# Patient Record
Sex: MALE | Race: WHITE | Employment: FULL TIME | URBAN - METROPOLITAN AREA
[De-identification: names, ages, dates, MRNs, and addresses within clinical notes are randomized per-mention and may not be internally consistent; named-entity substitution may affect disease eponyms.]

---

## 2021-01-15 ENCOUNTER — OFFICE VISIT (OUTPATIENT)
Dept: INTERNAL MEDICINE CLINIC | Age: 51
End: 2021-01-15
Payer: COMMERCIAL

## 2021-01-15 VITALS
BODY MASS INDEX: 26.72 KG/M2 | RESPIRATION RATE: 18 BRPM | DIASTOLIC BLOOD PRESSURE: 87 MMHG | OXYGEN SATURATION: 99 % | HEART RATE: 113 BPM | HEIGHT: 69 IN | TEMPERATURE: 98.2 F | SYSTOLIC BLOOD PRESSURE: 129 MMHG | WEIGHT: 180.4 LBS

## 2021-01-15 DIAGNOSIS — M54.50 CHRONIC BILATERAL LOW BACK PAIN WITHOUT SCIATICA: Primary | ICD-10-CM

## 2021-01-15 DIAGNOSIS — Z76.89 ENCOUNTER TO ESTABLISH CARE: ICD-10-CM

## 2021-01-15 DIAGNOSIS — G89.29 CHRONIC BILATERAL LOW BACK PAIN WITHOUT SCIATICA: Primary | ICD-10-CM

## 2021-01-15 DIAGNOSIS — K21.9 GASTROESOPHAGEAL REFLUX DISEASE WITHOUT ESOPHAGITIS: ICD-10-CM

## 2021-01-15 DIAGNOSIS — I10 ESSENTIAL HYPERTENSION: ICD-10-CM

## 2021-01-15 PROCEDURE — 99204 OFFICE O/P NEW MOD 45 MIN: CPT | Performed by: INTERNAL MEDICINE

## 2021-01-15 RX ORDER — ESOMEPRAZOLE MAGNESIUM 40 MG/1
40 CAPSULE, DELAYED RELEASE ORAL 2 TIMES DAILY
Qty: 180 CAP | Refills: 0 | Status: SHIPPED | OUTPATIENT
Start: 2021-01-15 | End: 2021-02-25 | Stop reason: SDUPTHER

## 2021-01-15 RX ORDER — ESOMEPRAZOLE MAGNESIUM 40 MG/1
40 CAPSULE, DELAYED RELEASE ORAL 2 TIMES DAILY
COMMUNITY
End: 2021-01-15 | Stop reason: SDUPTHER

## 2021-01-15 RX ORDER — LOSARTAN POTASSIUM 25 MG/1
25 TABLET ORAL DAILY
Qty: 30 TAB | Refills: 2 | Status: SHIPPED | OUTPATIENT
Start: 2021-01-15 | End: 2021-02-25 | Stop reason: SDUPTHER

## 2021-01-15 RX ORDER — GABAPENTIN 300 MG/1
300 CAPSULE ORAL 3 TIMES DAILY
Qty: 90 CAP | Refills: 2 | Status: SHIPPED | OUTPATIENT
Start: 2021-01-15 | End: 2021-02-25 | Stop reason: SDUPTHER

## 2021-01-15 RX ORDER — TRAMADOL HYDROCHLORIDE 50 MG/1
50 TABLET ORAL
COMMUNITY
End: 2021-01-15 | Stop reason: ALTCHOICE

## 2021-01-15 NOTE — PROGRESS NOTES
Lorne Dewitt (: 1970) is a 45 y.o. male, new patient, here for evaluation of the following chief complaint(s):  Chief Complaint   Patient presents with    New Patient     needs ultram 50 mg and Nexium 40mg.  Back Pain     lower back       Assessment and Plan:       ICD-10-CM ICD-9-CM    1. Chronic bilateral low back pain without sciatica  M54.5 724.2 gabapentin (NEURONTIN) 300 mg capsule    G89.29 338.29 CBC WITH AUTOMATED DIFF      METABOLIC PANEL, COMPREHENSIVE      10-DRUG SCREEN W/CONF   2. Gastroesophageal reflux disease without esophagitis  K21.9 530.81 esomeprazole (NexIUM) 40 mg capsule      CBC WITH AUTOMATED DIFF      METABOLIC PANEL, COMPREHENSIVE      REFERRAL TO GASTROENTEROLOGY   3. Encounter to establish care  Z76.89 V65.8    4. Essential hypertension  I10 401.9 losartan (COZAAR) 25 mg tablet       1. Alternative mgt reviewed. Plan start and titrate as needed. Monitoring labs reviewed as above. He will do at [facility] as reviewed. 2.  Refill at current dose and referral reviewed. Referral(s) and referral coordination reviewed with patient at visit. 4.  Notes and history elevated BP. Reviewed starting medication as below and monitoring at follow-up. Follow-up and Dispositions    · Return in about 4 weeks (around 2021) for medication follow-up, lab review; [facility] labs 1-2 weeks prior to visit.       lab results and schedule of future lab studies reviewed with patient  reviewed medications and side effects in detail    For additional documentation of information and/or recommendations discussed this visit, please see notes in instructions. Plan and evaluation (above) reviewed with pt at visit  Patient voiced understanding of plan and provided with time to ask/review questions. After Visit Summary (AVS) provided to pt after visit with additional instructions as needed/reviewed.       Future Appointments   Date Time Provider Leigh Ann Bashir   2021  3:30 PM Ivette Bailey MD [facility] AMB   --Updated future visits after patient check-out. History of Present Illness:     Notes (nursing/rooming note copied below in italics):  As above. Here to establish care. Pt has no records in [facility] or [address] [place] [address]. He has records from his prior PCP Dr. Adelita Brooks. He had received Tramadol and Nexium from PCP previously, but provider had to leave practice suddenly--this is not dated. He had Tramadol as 50mg every 6 hours on medication list, but receiving #240 (not #120) monthly as below. Reviewed do not do routine pain mgt and can refer, but would not refill here. He would prefer to try other medications that can be prescribed here. Reviewed gabapentin as below. He is not interested in referral to pain mgt for evaluation or other management. Prescription Monitoring Program ([place]) database query with fills:  Indra Diaz Date ID   Written Drug Qty Days Prescriber Rx # Pharmacy Refill   Daily Dose* Pymt Type      12/07/2020  1   07/16/2020  Tramadol Hcl 50 MG Tablet  240.00  30 Wi Squ   [id number]   [id number]   [facility]   11/09/2020  1   07/16/2020  Tramadol Hcl 50 MG Tablet  240.00  30 Wi Squ   [id number]   [id number]   4  [facility]   10/13/2020  1   07/16/2020  Tramadol Hcl 50 MG Tablet  240.00  30 Wi Squ   [id number]   [id number]   3  [facility]   09/16/2020  1   07/16/2020  Tramadol Hcl 50 MG Tablet  240.00  30 Wi Squ   [id number]   [id number]   2  [facility]   08/20/2020  1   07/16/2020  Tramadol Hcl 50 MG Tablet  240.00  30 Wi Squ   [id number]   [id number]   1  [facility]       He has copies of medical records and labs--labs from 2020     Notes indicate recommendation to see Dr. Feng Chavarria (not clarified what specialty). He notes he has back pain due to injury at work.   Recommended pain mgt, but he is not willing to see--prefers to     He has no history of spine/spinal injury. He notes he had initial injury with his back in 2011. He was managed as an outpatient, and saw orthopedist, who did spinal evaluation and started on physical therapy then. He notes has ongoign lifting with work and has pain/spasms fro that. He has pain daily due to cold weather. He will typically have spasms. He notes only right-sided (UE and LE arthritis. Prior surgery on his right knee. He notes has not seen GI to evaluate need for high-dose Nexium. He notes Zantac, or famotidine strong enough. Reviewed refill, but should see GI since on such high-dose PPI to manage. Nursing screenings reviewed by provider at visit. Past Medical History:   Diagnosis Date    GERD (gastroesophageal reflux disease)      History reviewed. No pertinent surgical history. Prior to Admission medications    Medication Sig Start Date End Date Taking? Authorizing Provider   esomeprazole (NexIUM) 40 mg capsule Take 40 mg by mouth two (2) times a day. Yes Provider, Historical   traMADoL (Ultram) 50 mg tablet Take 50 mg by mouth every six (6) hours as needed for Pain (1-2 tabs every six hours for pain). Yes Provider, Historical   Dexlansoprazole (DEXILANT) 60 mg CpDM Take 1 Cap by mouth daily. 7/3/12   Harris Chirinos MD        ROS  Complete ROS negative except as indicated in note. Vitals:    01/15/21 1446 01/15/21 1450   BP: (!) 150/95 129/87   Pulse: (!) 113    Resp: 18    Temp: 98.2 °F (36.8 °C)    TempSrc: Oral    SpO2: 99%    Weight: 180 lb 6.4 oz (81.8 kg)    Height: 5' 9\" (1.753 m)    PainSc:   7       Body mass index is 26.64 kg/m². Physical Exam:     Physical Exam  Vitals signs and nursing note reviewed. Constitutional:       General: He is not in acute distress. Appearance: Normal appearance. He is well-developed. He is not diaphoretic. HENT:      Head: Normocephalic and atraumatic. Eyes:      General: No scleral icterus. Right eye: No discharge. Left eye: No discharge. Conjunctiva/sclera: Conjunctivae normal.   Cardiovascular:      Rate and Rhythm: Normal rate and regular rhythm. Pulses: Normal pulses. Heart sounds: Normal heart sounds. No murmur. No friction rub. No gallop. Pulmonary:      Effort: Pulmonary effort is normal. No respiratory distress. Breath sounds: Normal breath sounds. No stridor. No wheezing, rhonchi or rales. Abdominal:      General: Bowel sounds are normal. There is no distension. Palpations: Abdomen is soft. Tenderness: There is no abdominal tenderness. There is no guarding or rebound. Musculoskeletal:         General: No swelling, tenderness or deformity. Right lower leg: No edema. Left lower leg: No edema. Skin:     General: Skin is warm. Coloration: Skin is not jaundiced or pale. Findings: No bruising, erythema or rash. Neurological:      General: No focal deficit present. Mental Status: He is alert. Motor: No abnormal muscle tone. Coordination: Coordination normal.      Gait: Gait normal.   Psychiatric:         Mood and Affect: Mood normal.         Behavior: Behavior normal.         Thought Content: Thought content normal.         Judgment: Judgment normal.         An electronic signature was used to authenticate this note.   -- Delta Garcia MD

## 2021-01-15 NOTE — PATIENT INSTRUCTIONS
Please follow the following instructions to process/authorize your referral, if needed: 
 
Referrals processing Please verify with your insurance IF you need referral authorization submitted. For insurance plans which require this, please follow the following steps. FAILURE TO DO SO MAY RESULT IN INABILITY TO SEE THE SPECIALIST YOU HAVE BEEN REFERRED TO (once you are scheduled to see them). 1. Call and schedule appointment with specialist 
2. Call our clinic and leave message with provider name, and date of appointment 3. We will then submit the referral to your insurance. This process takes 2-5 business days. If you have questions about scheduling or authorizing referral, you can review with our referral coordinator. You can review with her today if available/if you have time, or you can call to review once you have made your referral/appointment. If you are not sure if you need referral authorizations, please review with the referral coordinator(s), either prior to or after you have made the appointment, as reviewed.

## 2021-01-15 NOTE — PROGRESS NOTES
# 16  3 most recent PHQ Screens 1/15/2021   Little interest or pleasure in doing things Not at all   Feeling down, depressed, irritable, or hopeless Not at all   Total Score PHQ 2 0           Chief Complaint   Patient presents with   • New Patient     needs ultram 50 mg and Nexium 40mg.   • Back Pain     lower back       1. Have you been to the ER, urgent care clinic since your last visit?  Hospitalized since your last visit?No    2. Have you seen or consulted any other health care providers outside of the [facility] since your last visit?  Include any pap smears or colon screening. No    Health Maintenance Due   Topic Date Due   • DTaP/Tdap/Td series (1 - Tdap) 12/14/1990   • Lipid Screen  12/14/2009   • Shingrix Vaccine Age 50> (1 of 2) 12/14/2019   • Colorectal Cancer Screening Combo  12/14/2019   • Flu Vaccine (1) 09/01/2020       No flowsheet data found.        AVS, education, follow up and recommendations provided and addressed with patient.  services used to advise patient no

## 2021-01-21 LAB
ALBUMIN SERPL-MCNC: 4.2 G/DL (ref 3.8–4.9)
ALBUMIN/GLOB SERPL: 1.7 {RATIO} (ref 1.2–2.2)
ALP SERPL-CCNC: 124 IU/L (ref 39–117)
ALT SERPL-CCNC: 68 IU/L (ref 0–44)
AMPHETAMINES UR QL SCN: NEGATIVE NG/ML
AST SERPL-CCNC: 40 IU/L (ref 0–40)
BARBITURATES UR QL SCN: NEGATIVE NG/ML
BASOPHILS # BLD AUTO: 0.1 X10E3/UL (ref 0–0.2)
BASOPHILS NFR BLD AUTO: 0 %
BENZODIAZ UR QL SCN: NEGATIVE NG/ML
BILIRUB SERPL-MCNC: <0.2 MG/DL (ref 0–1.2)
BUN SERPL-MCNC: 11 MG/DL (ref 6–24)
BUN/CREAT SERPL: 11 (ref 9–20)
BZE UR QL SCN: NEGATIVE NG/ML
CALCIUM SERPL-MCNC: 9.6 MG/DL (ref 8.7–10.2)
CANNABINOIDS UR QL SCN: NEGATIVE NG/ML
CHLORIDE SERPL-SCNC: 105 MMOL/L (ref 96–106)
CO2 SERPL-SCNC: 26 MMOL/L (ref 20–29)
CREAT SERPL-MCNC: 0.96 MG/DL (ref 0.76–1.27)
CREAT UR-MCNC: 116.7 MG/DL (ref 20–300)
EOSINOPHIL # BLD AUTO: 0.4 X10E3/UL (ref 0–0.4)
EOSINOPHIL NFR BLD AUTO: 3 %
ERYTHROCYTE [DISTWIDTH] IN BLOOD BY AUTOMATED COUNT: 12.7 % (ref 11.6–15.4)
GLOBULIN SER CALC-MCNC: 2.5 G/DL (ref 1.5–4.5)
GLUCOSE SERPL-MCNC: 93 MG/DL (ref 65–99)
HCT VFR BLD AUTO: 43.8 % (ref 37.5–51)
HGB BLD-MCNC: 15.4 G/DL (ref 13–17.7)
IMM GRANULOCYTES # BLD AUTO: 0.1 X10E3/UL (ref 0–0.1)
IMM GRANULOCYTES NFR BLD AUTO: 1 %
LYMPHOCYTES # BLD AUTO: 3.1 X10E3/UL (ref 0.7–3.1)
LYMPHOCYTES NFR BLD AUTO: 27 %
MCH RBC QN AUTO: 30.3 PG (ref 26.6–33)
MCHC RBC AUTO-ENTMCNC: 35.2 G/DL (ref 31.5–35.7)
MCV RBC AUTO: 86 FL (ref 79–97)
MED LIST OPTION NOT SELECTED: NORMAL
METHADONE UR QL SCN: NEGATIVE NG/ML
MONOCYTES # BLD AUTO: 1 X10E3/UL (ref 0.1–0.9)
MONOCYTES NFR BLD AUTO: 9 %
NEUTROPHILS # BLD AUTO: 7 X10E3/UL (ref 1.4–7)
NEUTROPHILS NFR BLD AUTO: 60 %
OPIATES UR QL SCN: NEGATIVE NG/ML
OXYCODONE+OXYMORPHONE UR QL SCN: NEGATIVE NG/ML
PCP UR QL: NEGATIVE NG/ML
PH UR: 6.5 [PH] (ref 4.5–8.9)
PLATELET # BLD AUTO: 352 X10E3/UL (ref 150–450)
PLEASE NOTE:, 733157: NORMAL
POTASSIUM SERPL-SCNC: 4.6 MMOL/L (ref 3.5–5.2)
PROPOXYPH UR QL SCN: NEGATIVE NG/ML
PROT SERPL-MCNC: 6.7 G/DL (ref 6–8.5)
RBC # BLD AUTO: 5.09 X10E6/UL (ref 4.14–5.8)
SODIUM SERPL-SCNC: 143 MMOL/L (ref 134–144)
WBC # BLD AUTO: 11.6 X10E3/UL (ref 3.4–10.8)

## 2021-02-25 ENCOUNTER — OFFICE VISIT (OUTPATIENT)
Dept: INTERNAL MEDICINE CLINIC | Age: 51
End: 2021-02-25
Payer: COMMERCIAL

## 2021-02-25 VITALS
HEIGHT: 69 IN | DIASTOLIC BLOOD PRESSURE: 76 MMHG | WEIGHT: 179.25 LBS | HEART RATE: 105 BPM | BODY MASS INDEX: 26.55 KG/M2 | SYSTOLIC BLOOD PRESSURE: 125 MMHG | RESPIRATION RATE: 18 BRPM | OXYGEN SATURATION: 97 % | TEMPERATURE: 98.1 F

## 2021-02-25 DIAGNOSIS — D72.829 LEUKOCYTOSIS, UNSPECIFIED TYPE: ICD-10-CM

## 2021-02-25 DIAGNOSIS — G89.29 CHRONIC BILATERAL LOW BACK PAIN WITHOUT SCIATICA: ICD-10-CM

## 2021-02-25 DIAGNOSIS — K21.9 GASTROESOPHAGEAL REFLUX DISEASE WITHOUT ESOPHAGITIS: ICD-10-CM

## 2021-02-25 DIAGNOSIS — I10 ESSENTIAL HYPERTENSION: Primary | ICD-10-CM

## 2021-02-25 DIAGNOSIS — M54.50 CHRONIC BILATERAL LOW BACK PAIN WITHOUT SCIATICA: ICD-10-CM

## 2021-02-25 LAB
ALBUMIN SERPL-MCNC: 3.9 G/DL (ref 3.5–5)
ALBUMIN/GLOB SERPL: 1.2 {RATIO} (ref 1.1–2.2)
ALP SERPL-CCNC: 126 U/L (ref 45–117)
ALT SERPL-CCNC: 60 U/L (ref 12–78)
ANION GAP SERPL CALC-SCNC: 6 MMOL/L (ref 5–15)
AST SERPL-CCNC: 27 U/L (ref 15–37)
BASOPHILS # BLD: 0.1 K/UL (ref 0–0.1)
BASOPHILS NFR BLD: 1 % (ref 0–1)
BILIRUB SERPL-MCNC: 0.2 MG/DL (ref 0.2–1)
BUN SERPL-MCNC: 9 MG/DL (ref 6–20)
BUN/CREAT SERPL: 7 (ref 12–20)
CALCIUM SERPL-MCNC: 9.2 MG/DL (ref 8.5–10.1)
CHLORIDE SERPL-SCNC: 111 MMOL/L (ref 97–108)
CO2 SERPL-SCNC: 26 MMOL/L (ref 21–32)
CREAT SERPL-MCNC: 1.24 MG/DL (ref 0.7–1.3)
DIFFERENTIAL METHOD BLD: ABNORMAL
EOSINOPHIL # BLD: 0.4 K/UL (ref 0–0.4)
EOSINOPHIL NFR BLD: 3 % (ref 0–7)
ERYTHROCYTE [DISTWIDTH] IN BLOOD BY AUTOMATED COUNT: 13.1 % (ref 11.5–14.5)
GLOBULIN SER CALC-MCNC: 3.3 G/DL (ref 2–4)
GLUCOSE SERPL-MCNC: 106 MG/DL (ref 65–100)
HCT VFR BLD AUTO: 42.8 % (ref 36.6–50.3)
HGB BLD-MCNC: 14.3 G/DL (ref 12.1–17)
IMM GRANULOCYTES # BLD AUTO: 0.1 K/UL (ref 0–0.04)
IMM GRANULOCYTES NFR BLD AUTO: 1 % (ref 0–0.5)
LYMPHOCYTES # BLD: 3.2 K/UL (ref 0.8–3.5)
LYMPHOCYTES NFR BLD: 26 % (ref 12–49)
MCH RBC QN AUTO: 29.2 PG (ref 26–34)
MCHC RBC AUTO-ENTMCNC: 33.4 G/DL (ref 30–36.5)
MCV RBC AUTO: 87.3 FL (ref 80–99)
MONOCYTES # BLD: 1 K/UL (ref 0–1)
MONOCYTES NFR BLD: 8 % (ref 5–13)
NEUTS SEG # BLD: 7.5 K/UL (ref 1.8–8)
NEUTS SEG NFR BLD: 61 % (ref 32–75)
NRBC # BLD: 0 K/UL (ref 0–0.01)
NRBC BLD-RTO: 0 PER 100 WBC
PLATELET # BLD AUTO: 350 K/UL (ref 150–400)
PMV BLD AUTO: 10.2 FL (ref 8.9–12.9)
POTASSIUM SERPL-SCNC: 3.9 MMOL/L (ref 3.5–5.1)
PROT SERPL-MCNC: 7.2 G/DL (ref 6.4–8.2)
RBC # BLD AUTO: 4.9 M/UL (ref 4.1–5.7)
SODIUM SERPL-SCNC: 143 MMOL/L (ref 136–145)
WBC # BLD AUTO: 12.2 K/UL (ref 4.1–11.1)

## 2021-02-25 PROCEDURE — 99214 OFFICE O/P EST MOD 30 MIN: CPT | Performed by: INTERNAL MEDICINE

## 2021-02-25 RX ORDER — LOSARTAN POTASSIUM 25 MG/1
25 TABLET ORAL DAILY
Qty: 90 TAB | Refills: 1 | Status: SHIPPED | OUTPATIENT
Start: 2021-02-25 | End: 2021-08-18 | Stop reason: SDUPTHER

## 2021-02-25 RX ORDER — GABAPENTIN 300 MG/1
300 CAPSULE ORAL 3 TIMES DAILY
Qty: 270 CAP | Refills: 1 | Status: SHIPPED | OUTPATIENT
Start: 2021-02-25 | End: 2021-08-18

## 2021-02-25 RX ORDER — ESOMEPRAZOLE MAGNESIUM 40 MG/1
40 CAPSULE, DELAYED RELEASE ORAL 2 TIMES DAILY
Qty: 180 CAP | Refills: 1 | Status: SHIPPED | OUTPATIENT
Start: 2021-02-25 | End: 2021-08-18 | Stop reason: SDUPTHER

## 2021-02-25 NOTE — PROGRESS NOTES
Myrna Abraham (: 1970) is a 45 y.o. male, established patient, here for evaluation of the following chief complaint(s):  Chief Complaint   Patient presents with    Medication Refill     f/u       Assessment and Plan:       ICD-10-CM ICD-9-CM    1. Essential hypertension  J55 988.0 METABOLIC PANEL, COMPREHENSIVE      losartan (COZAAR) 25 mg tablet      METABOLIC PANEL, COMPREHENSIVE   2. Leukocytosis, unspecified type  D72.829 288.60 CBC WITH AUTOMATED DIFF      CBC WITH AUTOMATED DIFF   3. Chronic bilateral low back pain without sciatica  M54.5 724.2 gabapentin (NEURONTIN) 300 mg capsule    G89.29 338.29    4. Gastroesophageal reflux disease without esophagitis  K21.9 530.81 esomeprazole (NexIUM) 40 mg capsule       1. Reviewed refill as requested, and monitoring labs. 2.  Evaluation reviewed. No associated symptoms and mild elevation prior. 3.  Doing well with gabapentin instead of Tramadol. Refill reviewed at visit. 4.   Notes he did see GI and maintained on current BID dosing. Refill reviewed. Follow-up and Dispositions    · Return in about 6 months (around 2021) for Blood Pressure follow-up, medication follow-up, non-fasting labs. lab results and schedule of future lab studies reviewed with patient  reviewed medications and side effects in detail    Plan and evaluation (above) reviewed with pt at visit  Patient voiced understanding of plan and provided with time to ask/review questions. After Visit Summary (AVS) provided to pt after visit with additional instructions as needed/reviewed. Future Appointments   Date Time Provider Leigh Ann Bashir   2021  3:30 PM Carlee Escobar MD [facility] AMB   --Updated future visits after patient check-out. History of Present Illness:     Notes (nursing/rooming note copied below in italics):  As above. Here fro follow-up. Established care here with me 1-15-21. He notes doing well with gabapentin.   Notes not sedated and good pain control. Refills of medications reviewed as below. Prescription Monitoring Program ([place]) database query with fills:  Fill Date ID   Written Drug Qty Days Prescriber Rx # Pharmacy Refill   Daily Dose* Pymt Type      02/11/2021  1   01/15/2021  Gabapentin 300 MG Capsule  90.00  30 Cl Chi   [id number]   [id number]   01/15/2021  1   01/15/2021  Gabapentin 300 MG Capsule  90.00  30 Cl Chi   [id number]   [id number]   [facility]   12/07/2020  1   07/16/2020  Tramadol Hcl 50 MG Tablet  240.00  30 Wi Squ   [id number]   [facility] ([id number])   5  40.00 MME  [facility] Ins          Nursing screenings reviewed by provider at visit. Prior to Admission medications    Medication Sig Start Date End Date Taking? Authorizing Provider   esomeprazole (NexIUM) 40 mg capsule Take 1 Cap by mouth two (2) times a day. 1/15/21  Yes Cornelius Etienne MD   gabapentin (NEURONTIN) 300 mg capsule Take 1 Cap by mouth three (3) times daily. Max Daily Amount: 900 mg. Take instead of Tramadol. 1/15/21  Yes Cornelius Etienne MD   losartan (COZAAR) 25 mg tablet Take 1 Tab by mouth daily. 1/15/21  Yes Cornelius Etienne MD        ROS    Vitals:    02/25/21 1538 02/25/21 1610   BP: (!) 137/90 125/76   Pulse: (!) 105    Resp: 18    Temp: 98.1 °F (36.7 °C)    TempSrc: Temporal    SpO2: 97%    Weight: 179 lb 4 oz (81.3 kg)    Height: 5' 9\" (1.753 m)    PainSc:   0 - No pain       Body mass index is 26.47 kg/m². Physical Exam:     Physical Exam  Vitals signs and nursing note reviewed. Constitutional:       General: He is not in acute distress. Appearance: Normal appearance. He is well-developed. He is not diaphoretic. HENT:      Head: Normocephalic and atraumatic. Eyes:      General: No scleral icterus. Right eye: No discharge. Left eye: No discharge. Conjunctiva/sclera: Conjunctivae normal.   Cardiovascular:      Rate and Rhythm: Normal rate and regular rhythm. Pulses: Normal pulses. Heart sounds: Normal heart sounds. No murmur. No friction rub. No gallop. Pulmonary:      Effort: Pulmonary effort is normal. No respiratory distress. Breath sounds: Normal breath sounds. No stridor. No wheezing, rhonchi or rales. Abdominal:      General: Bowel sounds are normal. There is no distension. Palpations: Abdomen is soft. Tenderness: There is no abdominal tenderness. There is no guarding or rebound. Musculoskeletal:         General: No swelling, tenderness or deformity. Right lower leg: No edema. Left lower leg: No edema. Skin:     General: Skin is warm. Coloration: Skin is not jaundiced or pale. Findings: No bruising, erythema or rash. Neurological:      General: No focal deficit present. Mental Status: He is alert. Motor: No abnormal muscle tone. Coordination: Coordination normal.      Gait: Gait normal.   Psychiatric:         Mood and Affect: Mood normal.         Behavior: Behavior normal.         Thought Content: Thought content normal.         Judgment: Judgment normal.         An electronic signature was used to authenticate this note.   -- Shima Duran MD

## 2021-02-25 NOTE — PROGRESS NOTES
RM:    No chief complaint on file. There were no vitals taken for this visit. Recent Travel Screening and Travel History documentation     Travel Screening      No screening recorded since 02/24/21 0000      Travel History   Travel since 01/25/21     No documented travel since 01/25/21            Health Maintenance Due   Topic Date Due    Hepatitis C Screening  1970    DTaP/Tdap/Td series (1 - Tdap) 12/14/1990    Lipid Screen  12/14/2009    Shingrix Vaccine Age 50> (1 of 2) 12/14/2019    Colorectal Cancer Screening Combo  12/14/2019        No flowsheet data found.

## 2021-08-15 DIAGNOSIS — G89.29 CHRONIC BILATERAL LOW BACK PAIN WITHOUT SCIATICA: ICD-10-CM

## 2021-08-15 DIAGNOSIS — M54.50 CHRONIC BILATERAL LOW BACK PAIN WITHOUT SCIATICA: ICD-10-CM

## 2021-08-16 DIAGNOSIS — M54.50 CHRONIC BILATERAL LOW BACK PAIN WITHOUT SCIATICA: ICD-10-CM

## 2021-08-16 DIAGNOSIS — G89.29 CHRONIC BILATERAL LOW BACK PAIN WITHOUT SCIATICA: ICD-10-CM

## 2021-08-18 DIAGNOSIS — M54.50 CHRONIC BILATERAL LOW BACK PAIN WITHOUT SCIATICA: ICD-10-CM

## 2021-08-18 DIAGNOSIS — I10 ESSENTIAL HYPERTENSION: ICD-10-CM

## 2021-08-18 DIAGNOSIS — K21.9 GASTROESOPHAGEAL REFLUX DISEASE WITHOUT ESOPHAGITIS: ICD-10-CM

## 2021-08-18 DIAGNOSIS — G89.29 CHRONIC BILATERAL LOW BACK PAIN WITHOUT SCIATICA: ICD-10-CM

## 2021-08-18 RX ORDER — GABAPENTIN 300 MG/1
300 CAPSULE ORAL 3 TIMES DAILY
Qty: 270 CAPSULE | Refills: 1 | Status: CANCELLED | OUTPATIENT
Start: 2021-08-18

## 2021-08-18 RX ORDER — GABAPENTIN 300 MG/1
CAPSULE ORAL
Qty: 90 CAPSULE | Refills: 0 | OUTPATIENT
Start: 2021-08-18

## 2021-08-18 RX ORDER — ESOMEPRAZOLE MAGNESIUM 40 MG/1
40 CAPSULE, DELAYED RELEASE ORAL 2 TIMES DAILY
Qty: 180 CAPSULE | Refills: 1 | Status: SHIPPED | OUTPATIENT
Start: 2021-08-18 | End: 2022-02-28 | Stop reason: SDUPTHER

## 2021-08-18 RX ORDER — GABAPENTIN 300 MG/1
CAPSULE ORAL
Qty: 270 CAPSULE | Refills: 0 | Status: SHIPPED | OUTPATIENT
Start: 2021-08-18 | End: 2021-09-16 | Stop reason: SDUPTHER

## 2021-08-18 RX ORDER — LOSARTAN POTASSIUM 25 MG/1
25 TABLET ORAL DAILY
Qty: 90 TABLET | Refills: 1 | Status: SHIPPED | OUTPATIENT
Start: 2021-08-18 | End: 2021-12-28

## 2021-08-18 NOTE — TELEPHONE ENCOUNTER
Pt appointment had to be r/s due to provider being out of office  And he is completely out , pt is asking for medication to be sent in to pharmacy today he has been out for 4 days

## 2021-08-18 NOTE — TELEPHONE ENCOUNTER
Last visit 02/25/2021 MD Reji Chaudhary   Next appointment 08/27/2021 MD Reji Chaudhary   Previous refill encounter(s)   02/25/2021 Neurontin #[id number] with 1 refill     No access to      Requested Prescriptions     Pending Prescriptions Disp Refills    gabapentin (NEURONTIN) 300 mg capsule [Pharmacy Med Name: Gabapentin 300 MG Oral Capsule] 90 Capsule 0     Sig: TAKE 1 CAPSULE BY MOUTH THREE TIMES DAILY. MAX  DAILY  AMOUNT  900  MG. TAKE  INSTEAD  OF  TRAMADOL.

## 2021-08-18 NOTE — TELEPHONE ENCOUNTER
----- Message from Chi Willingham sent at 8/18/2021  9:19 AM EDT -----  Regarding: Dr. Reji Robles  General Message/Vendor Calls    Caller's first and last name:  Self      Reason for call:  Med check/Refill      Callback required yes/no and why:  Yes      Best contact number(s):  [phone]      Details to clarify the request:  Pt is out of Losartin and neurontin, med check appt on 08/27/21, pt requesting refills in the mean time      Chi Willingham

## 2021-08-19 NOTE — TELEPHONE ENCOUNTER
Refill request(s) approved--gabapentin.     Prescription Monitoring Program ([place]) database query with fills:  Ady Allamakee Date ID   Written Drug Qty Days Prescriber Rx # Pharmacy Refill   Daily Dose* Pymt Type      07/19/2021  1   02/25/2021  Gabapentin 300 MG Capsule  270.00  90 Cl Chi   [id number]   [id number]   1   Private Pay   [facility]   04/23/2021  1   02/25/2021  Gabapentin 300 MG Capsule  270.00  90 Cl Chi   [id number]   [facility] ([id number])   0   Private Pay   [facility]   03/24/2021  1   01/15/2021  Gabapentin 300 MG Capsule  90.00  30 Cl Chi   [id number]   [id number]   [facility]   02/11/2021  1   01/15/2021  Gabapentin 300 MG Capsule  90.00  30 Cl Chi   [id number]   [id number]   [facility]   01/15/2021  1   01/15/2021  Gabapentin 300 MG Capsule  90.00  30 Cl Chi   [id number]   [id number]   [facility]       Requested Prescriptions     Signed Prescriptions Disp Refills    gabapentin (NEURONTIN) 300 mg capsule 270 Capsule 0     Sig: TAKE 1 CAPSULE BY MOUTH THREE TIMES DAILY MAX  DAILY  AMOUNT  900  MG  TAKE  INSTEAD  OF  TRAMADOL     Authorizing Provider: Abimael Gant

## 2021-08-19 NOTE — TELEPHONE ENCOUNTER
Refill request(s) approved--losartan, esomeprazole. Refill protocol details (computer-generated) reviewed, as available. Duplicate request--gabapentin refilled today.

## 2021-09-16 ENCOUNTER — OFFICE VISIT (OUTPATIENT)
Dept: INTERNAL MEDICINE CLINIC | Age: 51
End: 2021-09-16
Payer: COMMERCIAL

## 2021-09-16 VITALS
TEMPERATURE: 98.6 F | WEIGHT: 170.2 LBS | SYSTOLIC BLOOD PRESSURE: 148 MMHG | BODY MASS INDEX: 26.71 KG/M2 | DIASTOLIC BLOOD PRESSURE: 93 MMHG | HEART RATE: 85 BPM | HEIGHT: 67 IN | OXYGEN SATURATION: 99 %

## 2021-09-16 DIAGNOSIS — M54.50 CHRONIC BILATERAL LOW BACK PAIN WITHOUT SCIATICA: ICD-10-CM

## 2021-09-16 DIAGNOSIS — K21.9 GASTROESOPHAGEAL REFLUX DISEASE WITHOUT ESOPHAGITIS: ICD-10-CM

## 2021-09-16 DIAGNOSIS — G89.29 CHRONIC BILATERAL LOW BACK PAIN WITHOUT SCIATICA: ICD-10-CM

## 2021-09-16 DIAGNOSIS — I10 ESSENTIAL HYPERTENSION: ICD-10-CM

## 2021-09-16 PROCEDURE — 99214 OFFICE O/P EST MOD 30 MIN: CPT | Performed by: INTERNAL MEDICINE

## 2021-09-16 RX ORDER — ESOMEPRAZOLE MAGNESIUM 40 MG/1
40 CAPSULE, DELAYED RELEASE ORAL 2 TIMES DAILY
Qty: 180 CAPSULE | Refills: 1 | Status: CANCELLED | OUTPATIENT
Start: 2021-09-16

## 2021-09-16 RX ORDER — GABAPENTIN 300 MG/1
CAPSULE ORAL
Qty: 270 CAPSULE | Refills: 1 | Status: SHIPPED | OUTPATIENT
Start: 2021-09-16 | End: 2022-02-28 | Stop reason: SDUPTHER

## 2021-09-16 RX ORDER — LOSARTAN POTASSIUM 25 MG/1
25 TABLET ORAL DAILY
Qty: 90 TABLET | Refills: 1 | Status: CANCELLED | OUTPATIENT
Start: 2021-09-16

## 2021-09-16 NOTE — PROGRESS NOTES
RM 18    Chief Complaint   Patient presents with    Hypertension    Medication Check       Visit Vitals  BP (!) 142/76   Pulse 85   Temp 98.6 °F (37 °C)   Ht 5' 7\" (1.702 m)   Wt 170 lb 3.2 oz (77.2 kg)   SpO2 99%   BMI 26.66 kg/m²       3 most recent PHQ Screens 9/16/2021   Little interest or pleasure in doing things Not at all   Feeling down, depressed, irritable, or hopeless Not at all   Total Score PHQ 2 0         1. Have you been to the ER, urgent care clinic since your last visit? Hospitalized since your last visit? No    2. Have you seen or consulted any other health care providers outside of the [address] [place] [address] since your last visit? Include any pap smears or colon screening. No    Health Maintenance Due   Topic Date Due    Hepatitis C Screening  Never done    COVID-19 Vaccine (1) Never done    DTaP/Tdap/Td series (1 - Tdap) Never done    Lipid Screen  Never done    Colorectal Cancer Screening Combo  Never done    Shingrix Vaccine Age 50> (1 of 2) Never done    Flu Vaccine (1) 09/01/2021       No flowsheet data found. AVS  education, follow up, and recommendations provided and addressed with patient.

## 2021-09-16 NOTE — PROGRESS NOTES
Celine Quintana (: 1970) is a 45 y.o. male, established patient, here for evaluation of the following chief complaint(s):  Chief Complaint   Patient presents with    Hypertension    Medication Check       Assessment and Plan:       ICD-10-CM ICD-9-CM    1. Gastroesophageal reflux disease without esophagitis  K21.9 530.81    2. Chronic bilateral low back pain without sciatica  M54.5 724.2 gabapentin (NEURONTIN) 300 mg capsule    G89.29 338.29    3. Essential hypertension  I10 401.9        1,3. Medication and refills available reviewed with pt at visit. 2.  Refill reviewed at visit. Follow-up and Dispositions    · Return in about 6 months (around 3/16/2022) for medication follow-up, Blood Pressure follow-up, non-fasting labs. reviewed medications and side effects in detail    Plan and evaluation (above) reviewed with pt at visit  Patient voiced understanding of plan and provided with time to ask/review questions. After Visit Summary (AVS) provided to pt after visit with additional instructions as needed/reviewed. No future appointments. --Updated future visits after patient check-out. History of Present Illness:     Notes (nursing/rooming note copied below in italics):  As above    Here for medicationBP follow-up. Prescription Monitoring Program ([place]) database query with fills:  2021  1   2021  Gabapentin 300 MG Capsule  270.00  90 Cl Chi   [id number]   [facility] ([id number])   1   Private Pay   [place]   2021  1   2021  Gabapentin 300 MG Capsule  270.00  90 Cl Chi   [id number]   [facility] ([id number])   0   Private Pay   [place]   2021  1   01/15/2021  Gabapentin 300 MG Capsule  90.00  30 Cl Chi   [id number]   [facility] ([id number])   [facility]  1   01/15/2021  Gabapentin 300 MG Capsule  90.00  30 Cl Chi   [id number]   [facility][id number][facility]   [facility]       Reviewed pt questions--that he should have losartan and Nexium refills at pharmacy as below.       Nursing screenings reviewed by provider at visit. Prior to Admission medications    Medication Sig Start Date End Date Taking? Authorizing Provider   gabapentin (NEURONTIN) 300 mg capsule TAKE 1 CAPSULE BY MOUTH THREE TIMES DAILY MAX  DAILY  AMOUNT  900  MG  TAKE  INSTEAD  OF  TRAMADOL 8/18/21  Yes Garrison Lopez MD   losartan (COZAAR) 25 mg tablet Take 1 Tablet by mouth daily. 8/18/21  Yes Garrison Lopez MD   esomeprazole (NexIUM) 40 mg capsule Take 1 Capsule by mouth two (2) times a day. 8/18/21  Yes Garrison Lopez MD        ROS    Vitals:    09/16/21 1559 09/16/21 1602 09/16/21 1651   BP: (!) 152/91 (!) 142/76 (!) 148/93   Pulse: 85     Temp: 98.6 °F (37 °C)     SpO2: 99%     Weight: 170 lb 3.2 oz (77.2 kg)     Height: 5' 7\" (1.702 m)     PainSc:   0 - No pain        Body mass index is 26.66 kg/m². Physical Exam:     Physical Exam  Vitals and nursing note reviewed. Constitutional:       General: He is not in acute distress. Appearance: Normal appearance. He is well-developed. He is not diaphoretic. HENT:      Head: Normocephalic and atraumatic. Eyes:      General: No scleral icterus. Right eye: No discharge. Left eye: No discharge. Conjunctiva/sclera: Conjunctivae normal.   Cardiovascular:      Rate and Rhythm: Normal rate and regular rhythm. Pulses: Normal pulses. Heart sounds: Normal heart sounds. No murmur heard. No friction rub. No gallop. Pulmonary:      Effort: Pulmonary effort is normal. No respiratory distress. Breath sounds: Normal breath sounds. No stridor. No wheezing, rhonchi or rales. Abdominal:      General: Bowel sounds are normal. There is no distension. Palpations: Abdomen is soft. Tenderness: There is no abdominal tenderness. There is no guarding or rebound. Musculoskeletal:         General: No swelling, tenderness or deformity. Cervical back: Neck supple. No rigidity. No muscular tenderness. Right lower leg: No edema. Left lower leg: No edema. Lymphadenopathy:      Cervical: No cervical adenopathy. Skin:     General: Skin is warm. Coloration: Skin is not jaundiced or pale. Findings: No bruising, erythema or rash. Neurological:      General: No focal deficit present. Mental Status: He is alert. Motor: No abnormal muscle tone. Coordination: Coordination normal.      Gait: Gait normal.   Psychiatric:         Mood and Affect: Mood normal.         Behavior: Behavior normal.         Thought Content: Thought content normal.         Judgment: Judgment normal.       An electronic signature was used to authenticate this note.   -- Mike Dubon MD

## 2021-12-27 DIAGNOSIS — I10 ESSENTIAL HYPERTENSION: ICD-10-CM

## 2021-12-28 RX ORDER — LOSARTAN POTASSIUM 25 MG/1
TABLET ORAL
Qty: 90 TABLET | Refills: 1 | Status: SHIPPED | OUTPATIENT
Start: 2021-12-28 | End: 2022-02-28 | Stop reason: SDUPTHER

## 2021-12-28 NOTE — TELEPHONE ENCOUNTER
Refill request(s) approved--losartan. Refill protocol details (computer-generated) reviewed, as available.

## 2022-02-28 ENCOUNTER — OFFICE VISIT (OUTPATIENT)
Dept: INTERNAL MEDICINE CLINIC | Age: 52
End: 2022-02-28
Payer: COMMERCIAL

## 2022-02-28 VITALS
HEART RATE: 104 BPM | WEIGHT: 173 LBS | BODY MASS INDEX: 27.15 KG/M2 | RESPIRATION RATE: 16 BRPM | HEIGHT: 67 IN | SYSTOLIC BLOOD PRESSURE: 135 MMHG | OXYGEN SATURATION: 97 % | DIASTOLIC BLOOD PRESSURE: 86 MMHG | TEMPERATURE: 98.1 F

## 2022-02-28 DIAGNOSIS — K21.9 GASTROESOPHAGEAL REFLUX DISEASE WITHOUT ESOPHAGITIS: ICD-10-CM

## 2022-02-28 DIAGNOSIS — G89.29 CHRONIC BILATERAL LOW BACK PAIN WITHOUT SCIATICA: ICD-10-CM

## 2022-02-28 DIAGNOSIS — I10 ESSENTIAL HYPERTENSION: ICD-10-CM

## 2022-02-28 DIAGNOSIS — M54.50 CHRONIC BILATERAL LOW BACK PAIN WITHOUT SCIATICA: ICD-10-CM

## 2022-02-28 PROCEDURE — 99214 OFFICE O/P EST MOD 30 MIN: CPT | Performed by: INTERNAL MEDICINE

## 2022-02-28 RX ORDER — LOSARTAN POTASSIUM 25 MG/1
25 TABLET ORAL DAILY
Qty: 90 TABLET | Refills: 1 | Status: SHIPPED | OUTPATIENT
Start: 2022-02-28 | End: 2022-10-20 | Stop reason: SDUPTHER

## 2022-02-28 RX ORDER — ESOMEPRAZOLE MAGNESIUM 40 MG/1
40 CAPSULE, DELAYED RELEASE ORAL 2 TIMES DAILY
Qty: 180 CAPSULE | Refills: 1 | Status: SHIPPED | OUTPATIENT
Start: 2022-02-28 | End: 2022-09-10

## 2022-02-28 RX ORDER — GABAPENTIN 300 MG/1
CAPSULE ORAL
Qty: 270 CAPSULE | Refills: 1 | Status: SHIPPED | OUTPATIENT
Start: 2022-02-28 | End: 2022-09-10

## 2022-02-28 NOTE — PROGRESS NOTES
rm 16    Chief Complaint   Patient presents with    GERD     f/u    Hypertension     f/u    Discuss Medications     f/u     1. Have you been to the ER, urgent care clinic since your last visit? Hospitalized since your last visit? No    2. Have you seen or consulted any other health care providers outside of the [address] [place] [address] since your last visit? Include any pap smears or colon screening.  No     Visit Vitals  /86 (BP 1 Location: Left arm, BP Patient Position: Sitting, BP Cuff Size: Adult)   Pulse (!) 104   Temp 98.1 °F (36.7 °C) (Oral)   Resp 16   Ht 5' 7\" (1.702 m)   Wt 173 lb (78.5 kg)   SpO2 97%   BMI 27.10 kg/m²

## 2022-02-28 NOTE — LETTER
3/6/2022 6:22 PM        Mr. Mariana Fink  [address] [address]                :  1970    Please see attached lab results. --Blood counts are normal (hemoglobin/anemia testing, platelets). White blood cells are elevated and slightly higher than prior testing. We can repeat at your next visit, or re-evaluate sooner if preferred. --Kidney and liver testing normal.      Please let me know if you have other questions.     Michelle Hammonds MD

## 2022-02-28 NOTE — PROGRESS NOTES
Munira Alamo (: 1970) is a 45 y.o. male, established patient, here for evaluation of the following chief complaint(s):  Chief Complaint   Patient presents with    GERD     f/u    Hypertension     f/u    Discuss Medications     f/u       Assessment and Plan:       ICD-10-CM ICD-9-CM    1. Chronic bilateral low back pain without sciatica  A74.39 456.9 METABOLIC PANEL, COMPREHENSIVE    G89.29 338.29 gabapentin (NEURONTIN) 300 mg capsule   2. Essential hypertension  I10 401.9 losartan (COZAAR) 25 mg tablet      CBC WITH AUTOMATED DIFF      METABOLIC PANEL, COMPREHENSIVE   3. Gastroesophageal reflux disease without esophagitis  K21.9 530.81 esomeprazole (NexIUM) 40 mg capsule      CBC WITH AUTOMATED DIFF      METABOLIC PANEL, COMPREHENSIVE       1-3:  Refills and Lab monitoring reviewed. Follow-up reviewed as below. Follow-up and Dispositions    · Return in about 6 months (around 2022) for medication follow-up.       lab results and schedule of future lab studies reviewed with patient  reviewed medications and side effects in detail    Plan and evaluation (above) reviewed with pt at visit  Patient voiced understanding of plan and provided with time to ask/review questions. After Visit Summary (AVS) provided to pt after visit with additional instructions as needed/reviewed. Future Appointments   Date Time Provider Leigh Ann Bashir   2022  4:00 PM Shadi Anthony MD [facility] AMB   --Updated future visits after patient check-out. History of Present Illness:     Notes (nursing/rooming note copied below in italics):  As above. Here for medication follow-up.       Prescription Monitoring Program ([place]) database query with fills:  Filled  Written  Sold  ID  Drug  QTY  Days  Prescriber  RX #  Dispenser  Refill  Daily Dose*  Pymt Type       01/15/2022  2021   1  Gabapentin 300 Mg Capsule   270.00  90  [facility]  [id number]  [facility] ([id number][facility]  0   [facility]  [facility]     10/15/2021 08/18/2021   1  Gabapentin 300 Mg Capsule   270.00  90  [facility]  [id number]  [facility] ([id number])  0   [place]     07/19/2021 02/25/2021   1  Gabapentin 300 Mg Capsule   270.00  90  [facility]  [id number]  [facility] ([id number])  1   Private Pay  [address]      04/23/2021 02/25/2021   1  Gabapentin 300 Mg Capsule   270.00  90  [facility]  [id number]  [facility] ([id number])  0           He notes he is not having problems with Nexium. He had seen GI and noted no problems with Nexium at current dosing. Didn't need to do endoscopy per pt report. He reviewed that with lying down he does get some reflux. He just has very late days and with eating late and lying down too soon, he still gets some reflux. He typically eats low-risk reflux foods. He is not fasting today. Reviewed lipids, in future, and he notes normal in past.  He is not interested in checking. Ate about 4hrs ago. Nursing screenings reviewed by provider at visit. Prior to Admission medications    Medication Sig Start Date End Date Taking? Authorizing Provider   losartan (COZAAR) 25 mg tablet Take 1 tablet by mouth once daily 12/28/21  Yes Ruby Marks MD   gabapentin (NEURONTIN) 300 mg capsule TAKE 1 CAPSULE BY MOUTH THREE TIMES DAILY MAX  DAILY  AMOUNT  900  MG  TAKE  INSTEAD  OF  TRAMADOL 9/16/21  Yes Ruby Marks MD   esomeprazole (NexIUM) 40 mg capsule Take 1 Capsule by mouth two (2) times a day. 8/18/21  Yes Ruby Marks MD        ROS    Vitals:    02/28/22 1533   BP: 135/86   Pulse: (!) 104   Resp: 16   Temp: 98.1 °F (36.7 °C)   TempSrc: Oral   SpO2: 97%   Weight: 173 lb (78.5 kg)   Height: 5' 7\" (1.702 m)      Body mass index is 27.1 kg/m². Physical Exam:     Physical Exam  Vitals and nursing note reviewed. Constitutional:       General: He is not in acute distress. Appearance: Normal appearance. He is well-developed. He is not diaphoretic. HENT:      Head: Normocephalic and atraumatic. Eyes:      General: No scleral icterus. Right eye: No discharge. Left eye: No discharge. Conjunctiva/sclera: Conjunctivae normal.   Cardiovascular:      Rate and Rhythm: Normal rate and regular rhythm. Pulses: Normal pulses. Heart sounds: Normal heart sounds. No murmur heard. No friction rub. No gallop. Pulmonary:      Effort: Pulmonary effort is normal. No respiratory distress. Breath sounds: Normal breath sounds. No stridor. No wheezing, rhonchi or rales. Abdominal:      General: Bowel sounds are normal. There is no distension. Palpations: Abdomen is soft. Tenderness: There is no abdominal tenderness. There is no guarding or rebound. Musculoskeletal:         General: No swelling, tenderness or deformity. Right lower leg: No edema. Left lower leg: No edema. Skin:     General: Skin is warm. Coloration: Skin is not jaundiced or pale. Findings: No bruising, erythema or rash. Neurological:      General: No focal deficit present. Mental Status: He is alert. Motor: No abnormal muscle tone. Coordination: Coordination normal.      Gait: Gait normal.   Psychiatric:         Mood and Affect: Mood normal.         Behavior: Behavior normal.         Thought Content: Thought content normal.         Judgment: Judgment normal.         An electronic signature was used to authenticate this note.   -- Scooter Barton MD

## 2022-03-01 LAB
ALBUMIN SERPL-MCNC: 4 G/DL (ref 3.5–5)
ALBUMIN/GLOB SERPL: 1.3 {RATIO} (ref 1.1–2.2)
ALP SERPL-CCNC: 117 U/L (ref 45–117)
ALT SERPL-CCNC: 39 U/L (ref 12–78)
ANION GAP SERPL CALC-SCNC: 5 MMOL/L (ref 5–15)
AST SERPL-CCNC: 22 U/L (ref 15–37)
BASOPHILS # BLD: 0 K/UL (ref 0–0.1)
BASOPHILS NFR BLD: 0 % (ref 0–1)
BILIRUB SERPL-MCNC: 0.2 MG/DL (ref 0.2–1)
BUN SERPL-MCNC: 16 MG/DL (ref 6–20)
BUN/CREAT SERPL: 15 (ref 12–20)
CALCIUM SERPL-MCNC: 9.5 MG/DL (ref 8.5–10.1)
CHLORIDE SERPL-SCNC: 108 MMOL/L (ref 97–108)
CO2 SERPL-SCNC: 25 MMOL/L (ref 21–32)
CREAT SERPL-MCNC: 1.08 MG/DL (ref 0.7–1.3)
DIFFERENTIAL METHOD BLD: ABNORMAL
EOSINOPHIL # BLD: 0.2 K/UL (ref 0–0.4)
EOSINOPHIL NFR BLD: 2 % (ref 0–7)
ERYTHROCYTE [DISTWIDTH] IN BLOOD BY AUTOMATED COUNT: 13.3 % (ref 11.5–14.5)
GLOBULIN SER CALC-MCNC: 3.1 G/DL (ref 2–4)
GLUCOSE SERPL-MCNC: 118 MG/DL (ref 65–100)
HCT VFR BLD AUTO: 44.2 % (ref 36.6–50.3)
HGB BLD-MCNC: 14 G/DL (ref 12.1–17)
IMM GRANULOCYTES # BLD AUTO: 0.1 K/UL (ref 0–0.04)
IMM GRANULOCYTES NFR BLD AUTO: 1 % (ref 0–0.5)
LYMPHOCYTES # BLD: 2.6 K/UL (ref 0.8–3.5)
LYMPHOCYTES NFR BLD: 20 % (ref 12–49)
MCH RBC QN AUTO: 28.3 PG (ref 26–34)
MCHC RBC AUTO-ENTMCNC: 31.7 G/DL (ref 30–36.5)
MCV RBC AUTO: 89.5 FL (ref 80–99)
MONOCYTES # BLD: 1.2 K/UL (ref 0–1)
MONOCYTES NFR BLD: 10 % (ref 5–13)
NEUTS SEG # BLD: 8.9 K/UL (ref 1.8–8)
NEUTS SEG NFR BLD: 67 % (ref 32–75)
NRBC # BLD: 0 K/UL (ref 0–0.01)
NRBC BLD-RTO: 0 PER 100 WBC
PLATELET # BLD AUTO: 345 K/UL (ref 150–400)
PMV BLD AUTO: 10.2 FL (ref 8.9–12.9)
POTASSIUM SERPL-SCNC: 3.8 MMOL/L (ref 3.5–5.1)
PROT SERPL-MCNC: 7.1 G/DL (ref 6.4–8.2)
RBC # BLD AUTO: 4.94 M/UL (ref 4.1–5.7)
SODIUM SERPL-SCNC: 138 MMOL/L (ref 136–145)
WBC # BLD AUTO: 13 K/UL (ref 4.1–11.1)

## 2022-03-31 ENCOUNTER — TELEPHONE (OUTPATIENT)
Dept: INTERNAL MEDICINE CLINIC | Age: 52
End: 2022-03-31

## 2022-03-31 NOTE — TELEPHONE ENCOUNTER
----- Message from Kodi Llamas sent at 3/31/2022 11:58 AM EDT -----  Subject: Message to Provider    QUESTIONS  Information for Provider? Please call patient as he has put in a request   for refills a week ago and has heard nothing. Please call. Thank you.   ---------------------------------------------------------------------------  --------------  CALL BACK INFO  What is the best way for the office to contact you? OK to leave message on   voicemail  Preferred Call Back Phone Number? [phone]  ---------------------------------------------------------------------------  --------------  SCRIPT ANSWERS  Relationship to Patient?  Self

## 2022-03-31 NOTE — TELEPHONE ENCOUNTER
Future Appointments:  Future Appointments   Date Time Provider Leigh Ann Bashir   8/29/2022  4:00 PM Omer Isaac MD [facility] AMB        Last Appointment With Me:  2/28/2022     Requested Prescriptions      No prescriptions requested or ordered in this encounter     Prior auth for nexium 40mg has been approved for 60 capsules for 30 days. Spoke with pharmacy on file they are filling the script now. Left VM for patient stating this.

## 2022-09-03 DIAGNOSIS — M54.50 CHRONIC BILATERAL LOW BACK PAIN WITHOUT SCIATICA: ICD-10-CM

## 2022-09-03 DIAGNOSIS — G89.29 CHRONIC BILATERAL LOW BACK PAIN WITHOUT SCIATICA: ICD-10-CM

## 2022-09-03 DIAGNOSIS — K21.9 GASTROESOPHAGEAL REFLUX DISEASE WITHOUT ESOPHAGITIS: ICD-10-CM

## 2022-09-08 NOTE — TELEPHONE ENCOUNTER
Last visit 02/28/2022 MD Anabella De León   Next appointment 09/12/2022 MD Anabella De León   Previous refill encounter(s)   02/28/2022:  - Nexium #180 with 1 refill,   - Neurontin #270 with 1 refill. No access to [facility] Only    Intervention Detail: New Rx: 2, reason: Patient Preference  Time Spent (min): 5      Requested Prescriptions     Pending Prescriptions Disp Refills    esomeprazole (NEXIUM) 40 mg capsule [Pharmacy Med Name: Esomeprazole Magnesium 40 MG Oral Capsule Delayed Release] 180 Capsule 0     Sig: Take 1 capsule by mouth twice daily    gabapentin (NEURONTIN) 300 mg capsule [Pharmacy Med Name: Gabapentin 300 MG Oral Capsule] 270 Capsule 0     Sig: TAKE 1 CAPSULE BY MOUTH THREE TIMES DAILY (MAX  DAILY  AMOUNT  900MG)  (TAKE  INSTEAD  OF  TRAMADOL)       ----- Message from Marc Blizzard sent at 9/8/2022  8:14 AM EDT -----  Subject: Refill Request    QUESTIONS  Name of Medication? gabapentin (NEURONTIN) 300 mg capsule  Patient-reported dosage and instructions? 1 tab 3 times daily  How many days do you have left? 0  Preferred Pharmacy? [facility] phone number (if available)? [phone]  ---------------------------------------------------------------------------  --------------,  Name of Medication? esomeprazole (NexIUM) 40 mg capsule  Patient-reported dosage and instructions? 1 capsule 2 times daily  How many days do you have left? 0  Preferred Pharmacy? [facility] phone number (if available)? [phone]  ---------------------------------------------------------------------------  --------------  John Hogan INFO  What is the best way for the office to contact you? OK to leave message on   voicemail  Preferred Call Back Phone Number? [phone]  ---------------------------------------------------------------------------  --------------  SCRIPT ANSWERS  Relationship to Patient?  Self

## 2022-09-10 RX ORDER — GABAPENTIN 300 MG/1
CAPSULE ORAL
Qty: 270 CAPSULE | Refills: 1 | Status: SHIPPED | OUTPATIENT
Start: 2022-09-10

## 2022-09-10 RX ORDER — ESOMEPRAZOLE MAGNESIUM 40 MG/1
CAPSULE, DELAYED RELEASE ORAL
Qty: 180 CAPSULE | Refills: 0 | Status: SHIPPED | OUTPATIENT
Start: 2022-09-10

## 2022-09-10 NOTE — TELEPHONE ENCOUNTER
Refill request(s) approved--Nexium. Plan review/adjust dosing with next follow-up. Future Appointments   Date Time Provider Leigh Ann Chaui   9/12/2022  3:30 PM Keron Knox MD [facility] AMB         Refill request(s) approved--gabapentin.     Prescription Monitoring Program ([place]) database query with fills:        Requested Prescriptions     Signed Prescriptions Disp Refills    esomeprazole (NEXIUM) 40 mg capsule 180 Capsule 0     Sig: Take 1 capsule by mouth twice daily     Authorizing Provider: Emma Davalos    gabapentin (NEURONTIN) 300 mg capsule 270 Capsule 1     Sig: TAKE 1 CAPSULE BY MOUTH THREE TIMES DAILY (MAX  DAILY  AMOUNT  900MG)  (TAKE  INSTEAD  OF  TRAMADOL)     Authorizing Provider: Emma Davalos

## 2022-10-20 ENCOUNTER — OFFICE VISIT (OUTPATIENT)
Dept: INTERNAL MEDICINE CLINIC | Age: 52
End: 2022-10-20
Payer: COMMERCIAL

## 2022-10-20 VITALS
RESPIRATION RATE: 16 BRPM | HEART RATE: 103 BPM | TEMPERATURE: 97.4 F | DIASTOLIC BLOOD PRESSURE: 86 MMHG | OXYGEN SATURATION: 98 % | BODY MASS INDEX: 29 KG/M2 | WEIGHT: 184.8 LBS | HEIGHT: 67 IN | SYSTOLIC BLOOD PRESSURE: 124 MMHG

## 2022-10-20 DIAGNOSIS — M54.50 CHRONIC BILATERAL LOW BACK PAIN WITHOUT SCIATICA: ICD-10-CM

## 2022-10-20 DIAGNOSIS — K21.9 GASTROESOPHAGEAL REFLUX DISEASE WITHOUT ESOPHAGITIS: Primary | ICD-10-CM

## 2022-10-20 DIAGNOSIS — G89.29 CHRONIC BILATERAL LOW BACK PAIN WITHOUT SCIATICA: ICD-10-CM

## 2022-10-20 DIAGNOSIS — I10 ESSENTIAL HYPERTENSION: ICD-10-CM

## 2022-10-20 PROCEDURE — 3074F SYST BP LT 130 MM HG: CPT | Performed by: INTERNAL MEDICINE

## 2022-10-20 PROCEDURE — 3078F DIAST BP <80 MM HG: CPT | Performed by: INTERNAL MEDICINE

## 2022-10-20 PROCEDURE — 99214 OFFICE O/P EST MOD 30 MIN: CPT | Performed by: INTERNAL MEDICINE

## 2022-10-20 RX ORDER — LOSARTAN POTASSIUM 25 MG/1
25 TABLET ORAL DAILY
Qty: 90 TABLET | Refills: 1 | Status: SHIPPED | OUTPATIENT
Start: 2022-10-20

## 2022-10-20 RX ORDER — PANTOPRAZOLE SODIUM 40 MG/1
40 TABLET, DELAYED RELEASE ORAL DAILY
Qty: 90 TABLET | Refills: 1 | Status: SHIPPED | OUTPATIENT
Start: 2022-10-20

## 2022-10-20 NOTE — PROGRESS NOTES
Millie Nation (: 1970) is a 45 y.o. male, established patient, here for evaluation of the following chief complaint(s):  Chief Complaint   Patient presents with    Follow-up     Medication check       Assessment and Plan:       ICD-10-CM ICD-9-CM    1. Gastroesophageal reflux disease without esophagitis  K21.9 530.81 pantoprazole (PROTONIX) 40 mg tablet      2. Essential hypertension  I10 401.9 losartan (COZAAR) 25 mg tablet      3. Chronic bilateral low back pain without sciatica  M54.50 724.2     G89.29 338.29           1:  Alternate PPI reviewed, as per instructions/below. 2.  Refill medication as reviewed. 3.  Continue current gabapentin mgt. Follow-up and Dispositions    Return in about 4 months (around 2023) for Blood Pressure follow-up, medication follow-up, non-fasting labs. lab results and schedule of future lab studies reviewed with patient  reviewed medications and side effects in detail    For additional documentation of information and/or recommendations discussed this visit, please see notes in instructions. Plan and evaluation (above) reviewed with pt at visit  Patient voiced understanding of plan and provided with time to ask/review questions. After Visit Summary (AVS) provided to pt after visit with additional instructions as needed/reviewed. Future Appointments   Date Time Provider Leigh Ann Bashir   2023  3:45 PM Niesha Parisi MD [facility] AMB   --Updated future visits after patient check-out. History of Present Illness:     Notes (nursing/rooming note copied below in italics):  As above    He had asked about a \"stronger\" heartburn medicine. He just felt \"weird\" yesterday from about 6AM until 11AM.    He didn't check his BP during that time, and not doing at home monitoring. Nursing screenings reviewed by provider at visit. Prior to Admission medications    Medication Sig Start Date End Date Taking?  Authorizing Provider esomeprazole (NEXIUM) 40 mg capsule Take 1 capsule by mouth twice daily 9/10/22  Yes Tremayne Weston MD   gabapentin (NEURONTIN) 300 mg capsule TAKE 1 CAPSULE BY MOUTH THREE TIMES DAILY (MAX  DAILY  AMOUNT  900MG)  (TAKE  INSTEAD  OF  TRAMADOL) 9/10/22  Yes Tremayne Weston MD   losartan (COZAAR) 25 mg tablet Take 1 Tablet by mouth daily. 2/28/22  Yes Tremayne Weston MD        ROS    Vitals:    10/20/22 1536   BP: 124/86   Pulse: (!) 103   Resp: 16   Temp: 97.4 °F (36.3 °C)   TempSrc: Oral   SpO2: 98%   Weight: 184 lb 12.8 oz (83.8 kg)   Height: 5' 7\" (1.702 m)   PainSc:   0 - No pain      Body mass index is 28.94 kg/m². Physical Exam:     Physical Exam  Vitals and nursing note reviewed. Constitutional:       General: He is not in acute distress. Appearance: Normal appearance. He is well-developed. He is not diaphoretic. HENT:      Head: Normocephalic and atraumatic. Eyes:      General: No scleral icterus. Right eye: No discharge. Left eye: No discharge. Conjunctiva/sclera: Conjunctivae normal.   Cardiovascular:      Rate and Rhythm: Normal rate and regular rhythm. Pulses: Normal pulses. Heart sounds: Normal heart sounds. No murmur heard. No friction rub. No gallop. Pulmonary:      Effort: Pulmonary effort is normal. No respiratory distress. Breath sounds: Normal breath sounds. No stridor. No wheezing, rhonchi or rales. Abdominal:      General: Bowel sounds are normal. There is no distension. Palpations: Abdomen is soft. Tenderness: There is no abdominal tenderness. There is no guarding or rebound. Musculoskeletal:         General: No swelling, tenderness or deformity. Right lower leg: No edema. Left lower leg: No edema. Skin:     General: Skin is warm. Coloration: Skin is not jaundiced or pale. Findings: No bruising, erythema or rash. Neurological:      General: No focal deficit present.       Mental Status: He is alert. Motor: No abnormal muscle tone. Coordination: Coordination normal.      Gait: Gait normal.   Psychiatric:         Mood and Affect: Mood normal.         Behavior: Behavior normal.         Thought Content: Thought content normal.         Judgment: Judgment normal.       An electronic signature was used to authenticate this note.   -- Lilly Miller MD

## 2022-10-20 NOTE — PATIENT INSTRUCTIONS
If Pantoprazole (Protonix) is not covered, fill the Nexium. Please let us know if pantoprazole is not covered, or have pharmacy fax us the information--then we can work on a prior auth prior to your next refill in 90-days.

## 2022-10-20 NOTE — PROGRESS NOTES
RM 16    Chief Complaint   Patient presents with    Follow-up     Medication check       Visit Vitals  /86 (BP 1 Location: Left upper arm, BP Patient Position: Sitting, BP Cuff Size: Large adult)   Pulse (!) 103   Temp 97.4 °F (36.3 °C) (Oral)   Resp 16   Ht 5' 7\" (1.702 m)   Wt 184 lb 12.8 oz (83.8 kg)   SpO2 98%   BMI 28.94 kg/m²       3 most recent PHQ Screens 10/20/2022   Little interest or pleasure in doing things Not at all   Feeling down, depressed, irritable, or hopeless Not at all   Total Score PHQ 2 0         1. Have you been to the ER, urgent care clinic since your last visit? Hospitalized since your last visit? No    2. Have you seen or consulted any other health care providers outside of the [address] [place] [address] since your last visit? Include any pap smears or colon screening. No    Health Maintenance Due   Topic Date Due    Hepatitis C Screening  Never done    COVID-19 Vaccine (1) Never done    DTaP/Tdap/Td series (1 - Tdap) Never done    Lipid Screen  Never done    Colorectal Cancer Screening Combo  Never done    Shingrix Vaccine Age 50> (1 of 2) Never done    Flu Vaccine (1) 08/01/2022       No flowsheet data found. AVS  education, follow up, and recommendations provided and addressed with patient.   services used to advise patient No

## 2022-12-16 DIAGNOSIS — K21.9 GASTROESOPHAGEAL REFLUX DISEASE WITHOUT ESOPHAGITIS: ICD-10-CM

## 2022-12-23 ENCOUNTER — TELEPHONE (OUTPATIENT)
Dept: INTERNAL MEDICINE CLINIC | Age: 52
End: 2022-12-23

## 2022-12-23 DIAGNOSIS — K21.9 GASTROESOPHAGEAL REFLUX DISEASE WITHOUT ESOPHAGITIS: ICD-10-CM

## 2022-12-23 RX ORDER — PANTOPRAZOLE SODIUM 40 MG/1
40 TABLET, DELAYED RELEASE ORAL DAILY
Qty: 90 TABLET | Refills: 1 | Status: CANCELLED | OUTPATIENT
Start: 2022-12-23

## 2022-12-23 NOTE — TELEPHONE ENCOUNTER
Writer contacted the [facility] and verified that the pt picked up a 90 day supply of the Protonix 40 mg on 11/14/2022.   ------------------------------------  Duplicate request: Protonix #90 with 1 refill was sent to [facility] on 10/20/2022. Last visit 10/20/2022 MD Patt Ybarra   Next appointment 02/28/2023 MD Patt Ybarra   Previous refill encounter(s)   10/20/2022 Protonix #90 with 1 refill. For Pharmacy Admin Tracking Only    Program: Medication Refill  Intervention Detail: New Rx: 1, reason: Patient Preference  Time Spent (min): 5      Requested Prescriptions     Pending Prescriptions Disp Refills    pantoprazole (PROTONIX) 40 mg tablet 90 Tablet 1     Sig: Take 1 Tablet by mouth daily. Take instead of esomeprazole (Nexium). ---- Message from Narendra Gutierrez sent at 12/23/2022  1:51 PM EST -----  Subject: Refill Request    QUESTIONS  Name of Medication? pantoprazole (PROTONIX) 40 mg tablet  Patient-reported dosage and instructions? 40mg  How many days do you have left? 10  Preferred Pharmacy? [facility] [place] [address] phone number (if available)? [phone]  Additional Information for Provider? Pt called in twice and only has ten   days left of medicationand want to know why medication is not getting   refilled . ---------------------------------------------------------------------------  --------------  Emelyn KOEHLER  What is the best way for the office to contact you? OK to leave message on   voicemail  Preferred Call Back Phone Number? [phone]  ---------------------------------------------------------------------------  --------------  SCRIPT ANSWERS  Relationship to Patient?  Self

## 2023-01-11 RX ORDER — ESOMEPRAZOLE MAGNESIUM 40 MG/1
CAPSULE, DELAYED RELEASE ORAL
Qty: 180 CAPSULE | Refills: 0 | OUTPATIENT
Start: 2023-01-11

## 2023-01-11 RX ORDER — PANTOPRAZOLE SODIUM 40 MG/1
TABLET, DELAYED RELEASE ORAL
Qty: 90 TABLET | Refills: 0 | Status: SHIPPED | OUTPATIENT
Start: 2023-01-11

## 2023-01-12 NOTE — TELEPHONE ENCOUNTER
Refill request(s) approved--pantoprazole--90-day supply with 0 refill(s). Nexium not refilled. Requested Prescriptions     Signed Prescriptions Disp Refills    pantoprazole (PROTONIX) 40 mg tablet 90 Tablet 0     Sig: TAKE 1 TABLET BY MOUTH ONCE DAILY. TAKE  INSTEAD  OF  ESOMEPRAZOLE(NEXIUM).      Authorizing Provider: Johny Jones     Refused Prescriptions Disp Refills    esomeprazole (NEXIUM) 40 mg capsule [Pharmacy Med Name: Esomeprazole Magnesium 40 MG Oral Capsule Delayed Release] 180 Capsule 0     Sig: Take 1 capsule by mouth twice daily     Refused By: Johny Jones     Reason for Refusal: Medication Discontinued

## 2023-04-27 ENCOUNTER — OFFICE VISIT (OUTPATIENT)
Dept: INTERNAL MEDICINE CLINIC | Age: 53
End: 2023-04-27

## 2023-04-27 VITALS
RESPIRATION RATE: 16 BRPM | HEIGHT: 67 IN | WEIGHT: 177 LBS | SYSTOLIC BLOOD PRESSURE: 117 MMHG | BODY MASS INDEX: 27.78 KG/M2 | DIASTOLIC BLOOD PRESSURE: 72 MMHG | TEMPERATURE: 98.1 F | OXYGEN SATURATION: 97 % | HEART RATE: 84 BPM

## 2023-04-27 DIAGNOSIS — M54.50 CHRONIC BILATERAL LOW BACK PAIN WITHOUT SCIATICA: ICD-10-CM

## 2023-04-27 DIAGNOSIS — K21.9 GASTROESOPHAGEAL REFLUX DISEASE WITHOUT ESOPHAGITIS: ICD-10-CM

## 2023-04-27 DIAGNOSIS — I10 ESSENTIAL HYPERTENSION: ICD-10-CM

## 2023-04-27 DIAGNOSIS — G89.29 CHRONIC BILATERAL LOW BACK PAIN WITHOUT SCIATICA: ICD-10-CM

## 2023-04-27 RX ORDER — GABAPENTIN 300 MG/1
CAPSULE ORAL
Qty: 270 CAPSULE | Refills: 1 | Status: SHIPPED | OUTPATIENT
Start: 2023-04-27

## 2023-04-27 RX ORDER — PANTOPRAZOLE SODIUM 40 MG/1
TABLET, DELAYED RELEASE ORAL
Qty: 90 TABLET | Refills: 3 | Status: SHIPPED | OUTPATIENT
Start: 2023-04-27

## 2023-04-27 RX ORDER — LOSARTAN POTASSIUM 25 MG/1
25 TABLET ORAL DAILY
Qty: 90 TABLET | Refills: 3 | Status: SHIPPED | OUTPATIENT
Start: 2023-04-27

## 2023-04-27 NOTE — PROGRESS NOTES
RM 15      Chief Complaint   Patient presents with    Medication Refill     Pt is here for a medication refill. PT states he has no concerns. 1. Have you been to the ER, urgent care clinic since your last visit? Hospitalized since your last visit? No    2. Have you seen or consulted any other health care providers outside of the [address] [place] [address] since your last visit? Include any pap smears or colon screening.  No        Visit Vitals  Wt 177 lb (80.3 kg)   BMI 27.72 kg/m²

## 2023-04-27 NOTE — PROGRESS NOTES
Nandini Morales (: 1970) is a 47 y.o. male, {established vs new:48201} patient, here for evaluation of the following chief complaint(s):  Chief Complaint   Patient presents with    Medication Refill     Pt is here for a medication refill. PT states he has no concerns. Assessment and Plan:   {Assessment and Plan:62886}      No future appointments. --Updated future visits after patient check-out. History of Present Illness:     Notes (nursing/rooming note copied below in italics):  ***    Prescription Monitoring Program ([place]) database query with fills:        Nursing screenings reviewed by provider at visit. {Choose one or more SmartLinks; press DELETE if none desired:[id number]}     Prior to Admission medications    Medication Sig Start Date End Date Taking? Authorizing Provider   pantoprazole (PROTONIX) 40 mg tablet TAKE 1 TABLET BY MOUTH ONCE DAILY. TAKE  INSTEAD  OF  ESOMEPRAZOLE(NEXIUM). 23  Yes Kenton Kanner, MD   losartan (COZAAR) 25 mg tablet Take 1 Tablet by mouth daily. 10/20/22  Yes Kenton Kanner, MD   gabapentin (NEURONTIN) 300 mg capsule TAKE 1 CAPSULE BY MOUTH THREE TIMES DAILY (MAX  DAILY  AMOUNT  900MG)  (TAKE  INSTEAD  OF  TRAMADOL) 9/10/22  Yes Kenton Kanner, MD   esomeprazole (NEXIUM) 40 mg capsule Take 1 capsule by mouth twice daily 9/10/22   Kenton Kanner, MD        ROS    Vitals:    23 1538   BP: 117/72   Pulse: 84   Resp: 16   Temp: 98.1 °F (36.7 °C)   TempSrc: Oral   SpO2: 97%   Weight: 177 lb (80.3 kg)   Height: 5' 7\" (1.702 m)      Body mass index is 27.72 kg/m². Physical Exam:     Physical Exam  Normal basic. No edema. Payment reviewew--pt questions. .      An electronic signature was used to authenticate this note.   -- Riana Welch MD

## 2023-05-05 ENCOUNTER — APPOINTMENT (OUTPATIENT)
Dept: INTERNAL MEDICINE CLINIC | Age: 53
End: 2023-05-05

## 2023-05-05 DIAGNOSIS — G89.29 CHRONIC BILATERAL LOW BACK PAIN WITHOUT SCIATICA: ICD-10-CM

## 2023-05-05 DIAGNOSIS — I10 ESSENTIAL HYPERTENSION: ICD-10-CM

## 2023-05-05 DIAGNOSIS — M54.50 CHRONIC BILATERAL LOW BACK PAIN WITHOUT SCIATICA: ICD-10-CM

## 2023-05-05 DIAGNOSIS — K21.9 GASTROESOPHAGEAL REFLUX DISEASE WITHOUT ESOPHAGITIS: ICD-10-CM

## 2023-05-05 LAB
ALBUMIN SERPL-MCNC: 3.7 G/DL (ref 3.5–5)
ALBUMIN/GLOB SERPL: 1.1 (ref 1.1–2.2)
ALP SERPL-CCNC: 119 U/L (ref 45–117)
ALT SERPL-CCNC: 40 U/L (ref 12–78)
ANION GAP SERPL CALC-SCNC: 4 MMOL/L (ref 5–15)
AST SERPL-CCNC: 24 U/L (ref 15–37)
BASOPHILS # BLD: 0.1 K/UL (ref 0–0.1)
BASOPHILS NFR BLD: 1 % (ref 0–1)
BILIRUB SERPL-MCNC: 0.2 MG/DL (ref 0.2–1)
BUN SERPL-MCNC: 14 MG/DL (ref 6–20)
BUN/CREAT SERPL: 11 (ref 12–20)
CALCIUM SERPL-MCNC: 9.3 MG/DL (ref 8.5–10.1)
CHLORIDE SERPL-SCNC: 108 MMOL/L (ref 97–108)
CO2 SERPL-SCNC: 26 MMOL/L (ref 21–32)
CREAT SERPL-MCNC: 1.23 MG/DL (ref 0.7–1.3)
DIFFERENTIAL METHOD BLD: ABNORMAL
EOSINOPHIL # BLD: 0.8 K/UL (ref 0–0.4)
EOSINOPHIL NFR BLD: 6 % (ref 0–7)
ERYTHROCYTE [DISTWIDTH] IN BLOOD BY AUTOMATED COUNT: 13.4 % (ref 11.5–14.5)
GLOBULIN SER CALC-MCNC: 3.3 G/DL (ref 2–4)
GLUCOSE SERPL-MCNC: 127 MG/DL (ref 65–100)
HCT VFR BLD AUTO: 41.4 % (ref 36.6–50.3)
HGB BLD-MCNC: 13.1 G/DL (ref 12.1–17)
IMM GRANULOCYTES # BLD AUTO: 0.1 K/UL (ref 0–0.04)
IMM GRANULOCYTES NFR BLD AUTO: 1 % (ref 0–0.5)
LYMPHOCYTES # BLD: 3.5 K/UL (ref 0.8–3.5)
LYMPHOCYTES NFR BLD: 27 % (ref 12–49)
MCH RBC QN AUTO: 27.2 PG (ref 26–34)
MCHC RBC AUTO-ENTMCNC: 31.6 G/DL (ref 30–36.5)
MCV RBC AUTO: 85.9 FL (ref 80–99)
MONOCYTES # BLD: 0.9 K/UL (ref 0–1)
MONOCYTES NFR BLD: 7 % (ref 5–13)
NEUTS SEG # BLD: 7.5 K/UL (ref 1.8–8)
NEUTS SEG NFR BLD: 58 % (ref 32–75)
NRBC # BLD: 0 K/UL (ref 0–0.01)
NRBC BLD-RTO: 0 PER 100 WBC
PLATELET # BLD AUTO: 338 K/UL (ref 150–400)
PMV BLD AUTO: 10.9 FL (ref 8.9–12.9)
POTASSIUM SERPL-SCNC: 3.9 MMOL/L (ref 3.5–5.1)
PROT SERPL-MCNC: 7 G/DL (ref 6.4–8.2)
RBC # BLD AUTO: 4.82 M/UL (ref 4.1–5.7)
SODIUM SERPL-SCNC: 138 MMOL/L (ref 136–145)
WBC # BLD AUTO: 12.8 K/UL (ref 4.1–11.1)

## 2023-10-02 NOTE — TELEPHONE ENCOUNTER
Last appointment: 04/27/2023 MD May Pascual   Next appointment: 12/14/2023 MD May Pascual  Previous refill encounter(s):   04/27/2023 Neurontin #270 with 1 refill     No access to [address] [place] [address]    Program: Medication Refill  Intervention Detail: New Rx: 1, reason: Patient Preference  Time Spent (min): 5      Requested Prescriptions     Pending Prescriptions Disp Refills    gabapentin (NEURONTIN) 300 MG capsule [Pharmacy Med Name: GABAPENTIN 300 MG CAPSULE] 270 capsule 0     Sig: TAKE ONE CAPSULE BY MOUTH THREE TIMES A DAY.  (TAKE INSTEAD OF TRAMADOL)

## 2023-10-08 RX ORDER — GABAPENTIN 300 MG/1
CAPSULE ORAL
Qty: 270 CAPSULE | Refills: 0 | OUTPATIENT
Start: 2023-10-08

## 2023-10-08 NOTE — TELEPHONE ENCOUNTER
Pt needs appt later this month, or by end of last script from 8-23-23, for further refills. Seeing every 6mo for gabapentin refills. Prescription Monitoring Program ([place]) database query with fills:      Future Appointments   Date Time Provider [address] [place] [address]   12/14/2023  2:00 PM Delilah Askew MD [facility] AMB     If his current script will labs until the 12-14-23 visit, he can just follow-up as scheduled.